# Patient Record
Sex: FEMALE | Race: ASIAN
[De-identification: names, ages, dates, MRNs, and addresses within clinical notes are randomized per-mention and may not be internally consistent; named-entity substitution may affect disease eponyms.]

---

## 2022-12-29 PROBLEM — Z00.00 ENCOUNTER FOR PREVENTIVE HEALTH EXAMINATION: Status: ACTIVE | Noted: 2022-12-29

## 2023-01-09 ENCOUNTER — APPOINTMENT (OUTPATIENT)
Dept: OBGYN | Facility: CLINIC | Age: 48
End: 2023-01-09

## 2023-02-23 ENCOUNTER — APPOINTMENT (OUTPATIENT)
Dept: OBGYN | Facility: CLINIC | Age: 48
End: 2023-02-23

## 2023-05-06 ENCOUNTER — RX RENEWAL (OUTPATIENT)
Age: 48
End: 2023-05-06

## 2023-05-06 ENCOUNTER — APPOINTMENT (OUTPATIENT)
Dept: ORTHOPEDIC SURGERY | Facility: CLINIC | Age: 48
End: 2023-05-06
Payer: COMMERCIAL

## 2023-05-06 ENCOUNTER — NON-APPOINTMENT (OUTPATIENT)
Age: 48
End: 2023-05-06

## 2023-05-06 DIAGNOSIS — M25.562 PAIN IN LEFT KNEE: ICD-10-CM

## 2023-05-06 PROCEDURE — 73562 X-RAY EXAM OF KNEE 3: CPT | Mod: LT

## 2023-05-06 PROCEDURE — 99203 OFFICE O/P NEW LOW 30 MIN: CPT

## 2023-05-06 RX ORDER — MELOXICAM 15 MG/1
15 TABLET ORAL
Qty: 90 | Refills: 0 | Status: ACTIVE | COMMUNITY
Start: 2023-05-06 | End: 1900-01-01

## 2023-05-06 NOTE — DATA REVIEWED
[FreeTextEntry1] : X-ray images were obtained at the office today.  AP, lateral, oblique views of the left knee reveal no acute fractures, dislocations, bony abnormalities.  Joint space well preserved

## 2023-05-06 NOTE — DISCUSSION/SUMMARY
[de-identified] : Patient has pain of the left knee.  Diagnosis and x-ray images were discussed with patient today.  At this time, due to patient's increased amount of pain, clinical exam, history of meniscal tear, an MRI is warranted at this time for further evaluation of the meniscus.  MRI would be helpful at this time additionally to guide us for further treatment, whether that be surgical or not.  In the meantime, patient was given a prescription for physical therapy to work on strengthening, stretching, range of motion modalities for 6 weeks.  Patient can continue wearing her knee sleeve, using over-the-counter anti-inflammatories, and alternate with ice and heat.  I also gave patient a prescription for meloxicam 15 mg to take as needed for pain and inflammation.  Pt was educated about risks and benefits of anti-inflammatories.  Anti-inflammatories can irritate the stomach lining, so if there are any symptoms of acid reflux or heartburn the patient was instructed to stop taking the medication. To help prevent this, it is best to take with a meal. They cannot be taken if the pt is on blood thinners and if the patient is at risk of high blood pressure, blood pressure should be monitored daily while taking the medication.  Cortisone injections were also discussed with patient, she will consider if the pain worsens and this conservative treatment started today does not work.  Patient will call 2 to 3 days after the MRI results, at that time her follow-up visit will be discussed.  Patient agrees above plan all questions were answered

## 2023-05-06 NOTE — PHYSICAL EXAM
[de-identified] : Physical exam of left knee:\par -No erythema, edema, ecchymosis present.  Skin intact\par -TTP over medial joint line\par -Calf is soft and nontender\par -Positive Rich's, negative anterior drawer, patient able to straight leg raise\par -Varus and valgus stability\par -Full ROM, discomfort with flexion\par -+2 posterior tibialis pulse\par -Sensation intact to light touch

## 2023-05-06 NOTE — HISTORY OF PRESENT ILLNESS
[de-identified] : 37-year-old female presents with left knee pain.  Earlier in the week, patient woke up in excruciating pain of her left knee.  Patient states it was almost debilitating, unable to bear any weight.  Patient went to work that day, and it just got worse by the end of the day.  Patient to call off work the next day due to the pain.  Patient states there is no injury or inciting event, however that night before she was playing with her daughter in the playground and may have twisted it awkwardly.  She states she walks a lot at work and has a young child who she is very active with.  For pain relief, patient takes ibuprofen and ices the knee.  Patient has a history of meniscal tear over 5 years ago, that was treated conservatively.  Patient has also recently bought a knee sleeve for pain relief.

## 2023-05-13 ENCOUNTER — APPOINTMENT (OUTPATIENT)
Dept: MRI IMAGING | Facility: CLINIC | Age: 48
End: 2023-05-13
Payer: COMMERCIAL

## 2023-05-13 PROCEDURE — 73721 MRI JNT OF LWR EXTRE W/O DYE: CPT | Mod: LT

## 2024-01-25 ENCOUNTER — RESULT REVIEW (OUTPATIENT)
Age: 49
End: 2024-01-25

## 2024-01-25 ENCOUNTER — OUTPATIENT (OUTPATIENT)
Dept: OUTPATIENT SERVICES | Facility: HOSPITAL | Age: 49
LOS: 1 days | End: 2024-01-25
Payer: COMMERCIAL

## 2024-01-25 DIAGNOSIS — R92.2 INCONCLUSIVE MAMMOGRAM: ICD-10-CM

## 2024-01-25 DIAGNOSIS — Z12.31 ENCOUNTER FOR SCREENING MAMMOGRAM FOR MALIGNANT NEOPLASM OF BREAST: ICD-10-CM

## 2024-01-25 PROCEDURE — 76641 ULTRASOUND BREAST COMPLETE: CPT | Mod: 50

## 2024-01-25 PROCEDURE — 76641 ULTRASOUND BREAST COMPLETE: CPT | Mod: 26,50

## 2024-01-25 PROCEDURE — 77067 SCR MAMMO BI INCL CAD: CPT

## 2024-01-25 PROCEDURE — 77063 BREAST TOMOSYNTHESIS BI: CPT

## 2024-01-25 PROCEDURE — 77067 SCR MAMMO BI INCL CAD: CPT | Mod: 26

## 2024-01-25 PROCEDURE — 77063 BREAST TOMOSYNTHESIS BI: CPT | Mod: 26

## 2024-01-26 DIAGNOSIS — Z12.31 ENCOUNTER FOR SCREENING MAMMOGRAM FOR MALIGNANT NEOPLASM OF BREAST: ICD-10-CM

## 2024-01-26 DIAGNOSIS — R92.2 INCONCLUSIVE MAMMOGRAM: ICD-10-CM

## 2025-01-06 ENCOUNTER — NON-APPOINTMENT (OUTPATIENT)
Age: 50
End: 2025-01-06

## 2025-04-10 ENCOUNTER — OUTPATIENT (OUTPATIENT)
Dept: OUTPATIENT SERVICES | Facility: HOSPITAL | Age: 50
LOS: 1 days | End: 2025-04-10
Payer: COMMERCIAL

## 2025-04-10 DIAGNOSIS — Z12.31 ENCOUNTER FOR SCREENING MAMMOGRAM FOR MALIGNANT NEOPLASM OF BREAST: ICD-10-CM

## 2025-04-10 DIAGNOSIS — R92.2 INCONCLUSIVE MAMMOGRAM: ICD-10-CM

## 2025-04-10 PROCEDURE — 77067 SCR MAMMO BI INCL CAD: CPT

## 2025-04-10 PROCEDURE — 77063 BREAST TOMOSYNTHESIS BI: CPT

## 2025-04-10 PROCEDURE — 76641 ULTRASOUND BREAST COMPLETE: CPT | Mod: 26,50

## 2025-04-10 PROCEDURE — 77063 BREAST TOMOSYNTHESIS BI: CPT | Mod: 26

## 2025-04-10 PROCEDURE — 76641 ULTRASOUND BREAST COMPLETE: CPT | Mod: 50

## 2025-04-10 PROCEDURE — 77067 SCR MAMMO BI INCL CAD: CPT | Mod: 26

## 2025-04-11 DIAGNOSIS — Z12.31 ENCOUNTER FOR SCREENING MAMMOGRAM FOR MALIGNANT NEOPLASM OF BREAST: ICD-10-CM

## 2025-04-11 DIAGNOSIS — R92.2 INCONCLUSIVE MAMMOGRAM: ICD-10-CM

## 2025-07-02 ENCOUNTER — OUTPATIENT (OUTPATIENT)
Dept: OUTPATIENT SERVICES | Facility: HOSPITAL | Age: 50
LOS: 1 days | End: 2025-07-02
Payer: COMMERCIAL

## 2025-07-02 DIAGNOSIS — Z00.8 ENCOUNTER FOR OTHER GENERAL EXAMINATION: ICD-10-CM

## 2025-07-02 DIAGNOSIS — E04.1 NONTOXIC SINGLE THYROID NODULE: ICD-10-CM

## 2025-07-02 PROCEDURE — 76536 US EXAM OF HEAD AND NECK: CPT | Mod: 26

## 2025-07-02 PROCEDURE — 76536 US EXAM OF HEAD AND NECK: CPT

## 2025-07-03 DIAGNOSIS — E04.1 NONTOXIC SINGLE THYROID NODULE: ICD-10-CM

## 2025-08-06 ENCOUNTER — NON-APPOINTMENT (OUTPATIENT)
Age: 50
End: 2025-08-06

## 2025-08-06 ENCOUNTER — APPOINTMENT (OUTPATIENT)
Dept: OBGYN | Facility: CLINIC | Age: 50
End: 2025-08-06
Payer: COMMERCIAL

## 2025-08-06 VITALS
HEIGHT: 65 IN | DIASTOLIC BLOOD PRESSURE: 88 MMHG | SYSTOLIC BLOOD PRESSURE: 161 MMHG | BODY MASS INDEX: 22.99 KG/M2 | WEIGHT: 138 LBS

## 2025-08-06 DIAGNOSIS — Z01.411 ENCOUNTER FOR GYNECOLOGICAL EXAMINATION (GENERAL) (ROUTINE) WITH ABNORMAL FINDINGS: ICD-10-CM

## 2025-08-06 DIAGNOSIS — N80.123: ICD-10-CM

## 2025-08-06 DIAGNOSIS — N80.129 DEEP ENDOMETRIOSIS OF OVARY, UNSPECIFIED OVARY: ICD-10-CM

## 2025-08-06 DIAGNOSIS — N80.9 ENDOMETRIOSIS, UNSPECIFIED: ICD-10-CM

## 2025-08-06 DIAGNOSIS — Z80.41 FAMILY HISTORY OF MALIGNANT NEOPLASM OF OVARY: ICD-10-CM

## 2025-08-06 PROCEDURE — 99459 PELVIC EXAMINATION: CPT | Mod: NC

## 2025-08-06 PROCEDURE — 99386 PREV VISIT NEW AGE 40-64: CPT

## 2025-08-06 RX ORDER — LEVONORGESTREL AND ETHINYL ESTRADIOL 0.1-0.02MG
0.1-2 KIT ORAL
Refills: 0 | Status: ACTIVE | COMMUNITY

## 2025-08-07 ENCOUNTER — NEW PATIENT (OUTPATIENT)
Dept: URBAN - METROPOLITAN AREA CLINIC 10 | Facility: CLINIC | Age: 50
End: 2025-08-07

## 2025-08-07 DIAGNOSIS — H04.321: ICD-10-CM

## 2025-08-07 PROCEDURE — 99204 OFFICE O/P NEW MOD 45 MIN: CPT

## 2025-08-07 ASSESSMENT — TONOMETRY
OS_IOP_MMHG: 19
OD_IOP_MMHG: 18

## 2025-08-07 ASSESSMENT — VISUAL ACUITY
OS_SC: 20/20
OD_SC: 20/20-2

## 2025-08-08 LAB — HPV HIGH+LOW RISK DNA PNL CVX: NOT DETECTED

## 2025-08-11 ENCOUNTER — OUTPATIENT (OUTPATIENT)
Dept: OUTPATIENT SERVICES | Facility: HOSPITAL | Age: 50
LOS: 1 days | End: 2025-08-11
Payer: COMMERCIAL

## 2025-08-11 ENCOUNTER — RESULT REVIEW (OUTPATIENT)
Age: 50
End: 2025-08-11

## 2025-08-11 DIAGNOSIS — Z01.411 ENCOUNTER FOR GYNECOLOGICAL EXAMINATION (GENERAL) (ROUTINE) WITH ABNORMAL FINDINGS: ICD-10-CM

## 2025-08-11 PROCEDURE — 72197 MRI PELVIS W/O & W/DYE: CPT

## 2025-08-11 PROCEDURE — A9579: CPT

## 2025-08-11 PROCEDURE — 72197 MRI PELVIS W/O & W/DYE: CPT | Mod: 26

## 2025-08-12 RX ORDER — LEVONORGESTREL AND ETHINYL ESTRADIOL 0.1-0.02MG
0.1-2 KIT ORAL DAILY
Qty: 3 | Refills: 3 | Status: ACTIVE | COMMUNITY
Start: 2025-08-11 | End: 1900-01-01

## 2025-08-14 ENCOUNTER — FOLLOW UP (OUTPATIENT)
Dept: URBAN - METROPOLITAN AREA CLINIC 10 | Facility: CLINIC | Age: 50
End: 2025-08-14

## 2025-08-14 DIAGNOSIS — H04.321: ICD-10-CM

## 2025-08-14 PROCEDURE — 99214 OFFICE O/P EST MOD 30 MIN: CPT

## 2025-08-14 ASSESSMENT — VISUAL ACUITY
OD_CC: 20/20
OS_CC: 20/20

## 2025-09-08 ENCOUNTER — NON-APPOINTMENT (OUTPATIENT)
Age: 50
End: 2025-09-08